# Patient Record
Sex: FEMALE | Race: WHITE | NOT HISPANIC OR LATINO | Employment: UNEMPLOYED | URBAN - METROPOLITAN AREA
[De-identification: names, ages, dates, MRNs, and addresses within clinical notes are randomized per-mention and may not be internally consistent; named-entity substitution may affect disease eponyms.]

---

## 2023-07-18 DIAGNOSIS — Z00.00 ENCOUNTER FOR PREVENTIVE HEALTH EXAMINATION: Primary | ICD-10-CM

## 2023-07-18 DIAGNOSIS — Z11.59 ENCOUNTER FOR HEPATITIS C SCREENING TEST FOR LOW RISK PATIENT: ICD-10-CM

## 2023-07-25 ENCOUNTER — LAB (OUTPATIENT)
Dept: LAB | Facility: LAB | Age: 43
End: 2023-07-25
Payer: COMMERCIAL

## 2023-07-25 ENCOUNTER — OFFICE VISIT (OUTPATIENT)
Dept: PRIMARY CARE | Facility: CLINIC | Age: 43
End: 2023-07-25
Payer: COMMERCIAL

## 2023-07-25 VITALS
WEIGHT: 95.2 LBS | SYSTOLIC BLOOD PRESSURE: 132 MMHG | DIASTOLIC BLOOD PRESSURE: 78 MMHG | BODY MASS INDEX: 16.87 KG/M2 | HEIGHT: 63 IN | HEART RATE: 98 BPM

## 2023-07-25 DIAGNOSIS — Z23 ENCOUNTER FOR IMMUNIZATION: ICD-10-CM

## 2023-07-25 DIAGNOSIS — Z00.00 ENCOUNTER FOR PREVENTIVE HEALTH EXAMINATION: ICD-10-CM

## 2023-07-25 DIAGNOSIS — Z11.59 ENCOUNTER FOR HEPATITIS C SCREENING TEST FOR LOW RISK PATIENT: ICD-10-CM

## 2023-07-25 DIAGNOSIS — Z71.6 ENCOUNTER FOR SMOKING CESSATION COUNSELING: ICD-10-CM

## 2023-07-25 DIAGNOSIS — Z00.00 ENCOUNTER FOR PREVENTIVE HEALTH EXAMINATION: Primary | ICD-10-CM

## 2023-07-25 LAB
ALANINE AMINOTRANSFERASE (SGPT) (U/L) IN SER/PLAS: 14 U/L (ref 7–45)
ALBUMIN (G/DL) IN SER/PLAS: 4.8 G/DL (ref 3.4–5)
ALKALINE PHOSPHATASE (U/L) IN SER/PLAS: 48 U/L (ref 33–110)
ANION GAP IN SER/PLAS: 13 MMOL/L (ref 10–20)
ASPARTATE AMINOTRANSFERASE (SGOT) (U/L) IN SER/PLAS: 26 U/L (ref 9–39)
BILIRUBIN TOTAL (MG/DL) IN SER/PLAS: 0.7 MG/DL (ref 0–1.2)
CALCIUM (MG/DL) IN SER/PLAS: 9.4 MG/DL (ref 8.6–10.3)
CARBON DIOXIDE, TOTAL (MMOL/L) IN SER/PLAS: 27 MMOL/L (ref 21–32)
CHLORIDE (MMOL/L) IN SER/PLAS: 105 MMOL/L (ref 98–107)
CHOLESTEROL (MG/DL) IN SER/PLAS: 147 MG/DL (ref 0–199)
CHOLESTEROL IN HDL (MG/DL) IN SER/PLAS: 82.7 MG/DL
CHOLESTEROL/HDL RATIO: 1.8
CREATININE (MG/DL) IN SER/PLAS: 0.8 MG/DL (ref 0.5–1.05)
ERYTHROCYTE DISTRIBUTION WIDTH (RATIO) BY AUTOMATED COUNT: 12.8 % (ref 11.5–14.5)
ERYTHROCYTE MEAN CORPUSCULAR HEMOGLOBIN CONCENTRATION (G/DL) BY AUTOMATED: 32.9 G/DL (ref 32–36)
ERYTHROCYTE MEAN CORPUSCULAR VOLUME (FL) BY AUTOMATED COUNT: 93 FL (ref 80–100)
ERYTHROCYTES (10*6/UL) IN BLOOD BY AUTOMATED COUNT: 4.38 X10E12/L (ref 4–5.2)
ESTIMATED AVERAGE GLUCOSE FOR HBA1C: 114 MG/DL
GFR FEMALE: >90 ML/MIN/1.73M2
GLUCOSE (MG/DL) IN SER/PLAS: 92 MG/DL (ref 74–99)
HEMATOCRIT (%) IN BLOOD BY AUTOMATED COUNT: 40.7 % (ref 36–46)
HEMOGLOBIN (G/DL) IN BLOOD: 13.4 G/DL (ref 12–16)
HEMOGLOBIN A1C/HEMOGLOBIN TOTAL IN BLOOD: 5.6 %
HEPATITIS C VIRUS AB PRESENCE IN SERUM: NONREACTIVE
LDL: 50 MG/DL (ref 0–99)
LEUKOCYTES (10*3/UL) IN BLOOD BY AUTOMATED COUNT: 8.4 X10E9/L (ref 4.4–11.3)
PLATELETS (10*3/UL) IN BLOOD AUTOMATED COUNT: 232 X10E9/L (ref 150–450)
POTASSIUM (MMOL/L) IN SER/PLAS: 4.2 MMOL/L (ref 3.5–5.3)
PROTEIN TOTAL: 7.1 G/DL (ref 6.4–8.2)
SODIUM (MMOL/L) IN SER/PLAS: 141 MMOL/L (ref 136–145)
THYROTROPIN (MIU/L) IN SER/PLAS BY DETECTION LIMIT <= 0.05 MIU/L: 1.74 MIU/L (ref 0.44–3.98)
TRIGLYCERIDE (MG/DL) IN SER/PLAS: 72 MG/DL (ref 0–149)
UREA NITROGEN (MG/DL) IN SER/PLAS: 10 MG/DL (ref 6–23)
VLDL: 14 MG/DL (ref 0–40)

## 2023-07-25 PROCEDURE — 90471 IMMUNIZATION ADMIN: CPT | Performed by: STUDENT IN AN ORGANIZED HEALTH CARE EDUCATION/TRAINING PROGRAM

## 2023-07-25 PROCEDURE — 99396 PREV VISIT EST AGE 40-64: CPT | Performed by: STUDENT IN AN ORGANIZED HEALTH CARE EDUCATION/TRAINING PROGRAM

## 2023-07-25 PROCEDURE — 85027 COMPLETE CBC AUTOMATED: CPT

## 2023-07-25 PROCEDURE — 3008F BODY MASS INDEX DOCD: CPT | Performed by: STUDENT IN AN ORGANIZED HEALTH CARE EDUCATION/TRAINING PROGRAM

## 2023-07-25 PROCEDURE — 4004F PT TOBACCO SCREEN RCVD TLK: CPT | Performed by: STUDENT IN AN ORGANIZED HEALTH CARE EDUCATION/TRAINING PROGRAM

## 2023-07-25 PROCEDURE — 36415 COLL VENOUS BLD VENIPUNCTURE: CPT

## 2023-07-25 PROCEDURE — 80061 LIPID PANEL: CPT

## 2023-07-25 PROCEDURE — 83036 HEMOGLOBIN GLYCOSYLATED A1C: CPT

## 2023-07-25 PROCEDURE — 84443 ASSAY THYROID STIM HORMONE: CPT

## 2023-07-25 PROCEDURE — 90715 TDAP VACCINE 7 YRS/> IM: CPT | Performed by: STUDENT IN AN ORGANIZED HEALTH CARE EDUCATION/TRAINING PROGRAM

## 2023-07-25 PROCEDURE — 86803 HEPATITIS C AB TEST: CPT

## 2023-07-25 PROCEDURE — 80053 COMPREHEN METABOLIC PANEL: CPT

## 2023-07-25 ASSESSMENT — PATIENT HEALTH QUESTIONNAIRE - PHQ9
2. FEELING DOWN, DEPRESSED OR HOPELESS: NOT AT ALL
SUM OF ALL RESPONSES TO PHQ9 QUESTIONS 1 AND 2: 0
1. LITTLE INTEREST OR PLEASURE IN DOING THINGS: NOT AT ALL

## 2023-07-25 NOTE — ASSESSMENT & PLAN NOTE
As discussed, try smoking 1 fewer cigarettes per week   If this is unsuccessful, we can discuss pharmaceutical options

## 2023-07-25 NOTE — ASSESSMENT & PLAN NOTE
Complete history and physical examination was performed  Tdap vaccine administered in office today  We will notify of test results once available and make treatment recommendations accordingly

## 2023-07-25 NOTE — PROGRESS NOTES
"Subjective   Patient ID: Rosa Araujo is a 43 y.o. female who presents for Annual Exam.    HPI  1.  Physical exam.  Last mammogram November 2022 with 1 year clearance  Last Pap December 2022, normal, follows with gynecology  Due for Tdap  Blood work done in anticipation of today's visit  Still smoking 5 to 6 cigarettes daily, started in her 20s smoking about a pack a day    Review of Systems  All pertinent positive symptoms are included in the history of present illness.    All other systems have been reviewed and are negative and noncontributory to this patient's current ailments.    No current outpatient medications  Allergies   Allergen Reactions    Naproxen Sodium Rash       Immunization History   Administered Date(s) Administered    Pfizer Purple Cap SARS-CoV-2 03/22/2021, 04/19/2021, 12/06/2021    Tdap 07/24/2013, 07/25/2023     Past Surgical History:   Procedure Laterality Date    OTHER SURGICAL HISTORY  12/13/2022    No history of surgery     No family history on file.  Social History     Tobacco Use    Smoking status: Every Day     Types: Cigarettes    Smokeless tobacco: Never       Objective   Visit Vitals  /78   Pulse 98   Ht 1.6 m (5' 3\")   Wt (!) 43.2 kg (95 lb 3.2 oz)   BMI 16.86 kg/m²   Smoking Status Every Day   BSA 1.39 m²       Physical Exam  CONSTITUTIONAL - well nourished, well developed, looks like stated age, in no acute distress, not ill-appearing, and not tired appearing  SKIN - normal skin color and pigmentation, normal skin turgor without rash, lesions, or nodules visualized  HEAD - no trauma, normocephalic  CHEST - clear to auscultation, no wheezing, no crackles and no rales, good effort  CARDIAC - regular rate and regular rhythm, no skipped beats, no murmur  EXTREMITIES - no edema, no deformities  NEUROLOGICAL - normal gait, normal balance, normal motor  PSYCHIATRIC - alert, pleasant and cordial, age-appropriate    Assessment/Plan   Problem List Items Addressed This Visit  "         Health Encounters    Encounter for preventive health examination - Primary     Complete history and physical examination was performed  Tdap vaccine administered in office today  We will notify of test results once available and make treatment recommendations accordingly            Tobacco    Encounter for smoking cessation counseling     As discussed, try smoking 1 fewer cigarettes per week   If this is unsuccessful, we can discuss pharmaceutical options          Other Visit Diagnoses       Encounter for immunization        Relevant Orders    Tdap vaccine, age 10 years and older  (BOOSTRIX) (Completed)    Adult BMI <19 kg/sq m

## 2024-02-01 ENCOUNTER — HOSPITAL ENCOUNTER (OUTPATIENT)
Dept: RADIOLOGY | Facility: CLINIC | Age: 44
Discharge: HOME | End: 2024-02-01
Payer: COMMERCIAL

## 2024-02-01 VITALS — BODY MASS INDEX: 16.88 KG/M2 | HEIGHT: 63 IN | WEIGHT: 95.24 LBS

## 2024-02-01 DIAGNOSIS — Z12.31 ENCOUNTER FOR SCREENING MAMMOGRAM FOR MALIGNANT NEOPLASM OF BREAST: ICD-10-CM

## 2024-02-01 PROCEDURE — 77063 BREAST TOMOSYNTHESIS BI: CPT | Performed by: RADIOLOGY

## 2024-02-01 PROCEDURE — 77067 SCR MAMMO BI INCL CAD: CPT | Performed by: RADIOLOGY

## 2024-02-01 PROCEDURE — 77067 SCR MAMMO BI INCL CAD: CPT

## 2025-06-17 ENCOUNTER — HOSPITAL ENCOUNTER (OUTPATIENT)
Dept: RADIOLOGY | Facility: HOSPITAL | Age: 45
Discharge: HOME | End: 2025-06-17
Payer: COMMERCIAL

## 2025-06-17 ENCOUNTER — OFFICE VISIT (OUTPATIENT)
Dept: ORTHOPEDIC SURGERY | Facility: HOSPITAL | Age: 45
End: 2025-06-17
Payer: COMMERCIAL

## 2025-06-17 VITALS — WEIGHT: 100 LBS | HEIGHT: 63 IN | BODY MASS INDEX: 17.72 KG/M2

## 2025-06-17 DIAGNOSIS — M25.522 LEFT ELBOW PAIN: ICD-10-CM

## 2025-06-17 DIAGNOSIS — M77.12 LATERAL EPICONDYLITIS OF LEFT ELBOW: ICD-10-CM

## 2025-06-17 PROCEDURE — 99203 OFFICE O/P NEW LOW 30 MIN: CPT | Performed by: PHYSICIAN ASSISTANT

## 2025-06-17 PROCEDURE — 73080 X-RAY EXAM OF ELBOW: CPT | Mod: LT

## 2025-06-17 PROCEDURE — 73080 X-RAY EXAM OF ELBOW: CPT | Mod: LEFT SIDE | Performed by: RADIOLOGY

## 2025-06-17 PROCEDURE — 3008F BODY MASS INDEX DOCD: CPT | Performed by: PHYSICIAN ASSISTANT

## 2025-06-17 RX ORDER — MELOXICAM 15 MG/1
15 TABLET ORAL DAILY
Qty: 14 TABLET | Refills: 0 | Status: SHIPPED | OUTPATIENT
Start: 2025-06-17 | End: 2025-07-01

## 2025-06-17 RX ORDER — MELOXICAM 15 MG/1
15 TABLET ORAL DAILY
Qty: 14 TABLET | Refills: 0 | Status: SHIPPED | OUTPATIENT
Start: 2025-06-17 | End: 2025-06-17 | Stop reason: SDUPTHER

## 2025-06-17 ASSESSMENT — PAIN SCALES - GENERAL: PAINLEVEL_OUTOF10: 7

## 2025-06-17 ASSESSMENT — PAIN - FUNCTIONAL ASSESSMENT: PAIN_FUNCTIONAL_ASSESSMENT: 0-10

## 2025-06-17 NOTE — PROGRESS NOTES
HPI  This is a pleasant 44 y.o. female here today at the Orthopedic injury clinic for further evaluation of left elbow pain.   She is RHD.  The pain started about a month ago.  She does recall bumping her elbow but is unsure if that is related to her current pain.  It is starting to get more constant.  Pain is worse with pulling and grabbing with the left arm.  That pain can get up to a 10 out of 10.  She denies any radicular pain in the arm.  No numbness or tingling.      Medical History[1]    Surgical History[2]    Social History[3]        ROS  Reviewed and all pertinent positives mentioned in HPI.      EXAM  Gen:  Patient is in no acute distress.    HEENT: NCAT, EOMI  RESP: unlabored breathing  Skin:  warm, dry, no open wounds  Neur: They are alert and oriented x3.    Psych: They are of normal mood and affect.  They are in no acute distress.      Musc:   On examination of the left elbow;  Normal alignment;  Minimal swelling, no ecchymosis   Normal elbow extension,flexion, pronation and supination, Normal ROM in wrist flexion, pronation and supination, ROM finger and thumb normal.   Pain with resisted wrist extension.  Normal strength in elbow extension, flexion, pronation and supination; wrist finger and thumb strength normal; normal  strength  Tenderness to palpation: lateral epicondyle  No tenderness over the olecranon UCL, radius, ulna, medial or lateral epicondyles, scaphoid  NVI, good cap refill        IMAGING  Imaging reviewed today reveal no gross fracture or dislocation.        ASSESSMENT/PLAN  Left elbow pain consistent with Lateral epicondylitis.  Will start a course of consistent NSAIDs and OT. Also discussed bracing.   Provided follow up information if symptoms persist.    MELECIO Beaulieu PA-C         [1] No past medical history on file.  [2]   Past Surgical History:  Procedure Laterality Date    OTHER SURGICAL HISTORY  12/13/2022    No history of surgery   [3]   Social  History  Tobacco Use    Smoking status: Every Day     Types: Cigarettes    Smokeless tobacco: Never

## 2025-06-24 DIAGNOSIS — M77.12 LATERAL EPICONDYLITIS OF LEFT ELBOW: Primary | ICD-10-CM

## 2025-07-07 ENCOUNTER — EVALUATION (OUTPATIENT)
Dept: OCCUPATIONAL THERAPY | Facility: CLINIC | Age: 45
End: 2025-07-07
Payer: COMMERCIAL

## 2025-07-07 DIAGNOSIS — M25.522 LEFT ELBOW PAIN: Primary | ICD-10-CM

## 2025-07-07 DIAGNOSIS — M77.12 LATERAL EPICONDYLITIS OF LEFT ELBOW: ICD-10-CM

## 2025-07-07 PROCEDURE — 97165 OT EVAL LOW COMPLEX 30 MIN: CPT | Mod: GO

## 2025-07-07 PROCEDURE — 97530 THERAPEUTIC ACTIVITIES: CPT | Mod: GO

## 2025-07-07 PROCEDURE — 97035 APP MDLTY 1+ULTRASOUND EA 15: CPT | Mod: GO

## 2025-07-07 ASSESSMENT — ENCOUNTER SYMPTOMS
OCCASIONAL FEELINGS OF UNSTEADINESS: 0
DEPRESSION: 0
LOSS OF SENSATION IN FEET: 0

## 2025-07-07 ASSESSMENT — PAIN DESCRIPTION - DESCRIPTORS: DESCRIPTORS: ACHING

## 2025-07-07 ASSESSMENT — PAIN - FUNCTIONAL ASSESSMENT: PAIN_FUNCTIONAL_ASSESSMENT: 0-10

## 2025-07-07 ASSESSMENT — PAIN SCALES - GENERAL: PAINLEVEL_OUTOF10: 3

## 2025-07-07 NOTE — PROGRESS NOTES
Occupational Therapy    Evaluation/Treatment    Patient Name: Rosa Araujo  MRN: [id number]  : 1980  Today's Date: 25     Time Calculation  Start Time: 1015  Stop Time: 1100  Time Calculation (min): 45 min  Therapeutic Procedure Codes:  OT Evaluation Time Entry  Evaluation (Low) Time Entry: 20   OT Therapeutic Procedures Time Entry  Therapeutic Activity Time Entry: 17   OT Modalities Time Entry  Ultrasound Time Entry: 8                     Subjective   Current Problem:  1. Left elbow pain        2. Lateral epicondylitis of left elbow  Referral to Occupational Therapy    Follow Up In Occupational Therapy        General: Pt presents with L elbow pain and weakness d/t L lateral epicondylitis, affecting her ability to carryout I/ADLs efficiently. Pt states the pain in her elbow began sometime in May 2025; pt cannot pinpoint a specific event, but assumed she bumped her elbow and the pain would go away in a week. Pt states that pain got progressively worse with activity. Therefore, pt went to ortho for further investigation. Pt diagnosed with L lateral epicondylitis and instructed to take ibuprofen as needed, ice, rest and use counter force pain to combat symptoms, per chart review. Pt subsequently referred to occupational therapy for skilled services.     Pt states that the pain at her arm has subsided a little bit since her visit with the orthopedic doctor. Pt was unsure when to wear OTC brace.   OT Received On: 25  General  Reason for Referral: L elbow pain  Referred By: Larissa Barroso PA-C        Hand Preference: Right/L injury     Precautions:  STEADI Fall Risk Score (The score of 4 or more indicates an increased risk of falling): 0  Precautions Comment: pain     I have reviewed patients medical history form.   Pain:  Pain Assessment  Pain Assessment: 0-10  0-10 (Numeric) Pain Score: 3 (3/10 at rest; 7-8/10 with gripping at L elbow)  Pain Location: Elbow  Pain Orientation: Left  Pain Descriptors:  Aching    Objective      Home Living: Lives with  and two kids (14, 11)       Prior Function: IND; stay at home mom. Active - does yoga and weight lifting.       ADL/IADL: IND; pain with activity, especially while gripping or pulling objects. Pt is not weight lifting or doing yoga d/t pain.         Therapy/Activity:    DOI: May 2025      7/7/2025   Exercises: Reps:   Lateral epicondylitis AROM Phase II (4)   PROM Phase III (4)  1x3, 15 second holds each    Elbow extended, wrist flexed/extended 1x3, 15 second holds                    HEP provided to pt on 7/7/2025   HEP provided to pt including lateral epicondylitis AROM, STM and activity modification . Pt instructed to complete 2-3x a day, 10 reps within pain free range. Handouts provided to pt.    Activities:     Educated pt on importance of wearing counterforce brace while completing activity   Elbow sleeve Fabricated pt padded elbow sleeve for protection and pain relief.            Modalities:    U/S 3.3 MHz 1 W/cm^2 about L lateral epicondyle and surrounding tissues for pain relief.                Manual:    STM CW, CCW, cross-directional about L lateral epicondyle for pain relief. Instructed pt to complete at home.                Functional review:     Completed on: 7/7/2025 OT evaluation      Measurements:    Elbow ROM:  WFL unless documented below   Flexion Extension  Supination Pronation   Right 145 0 90 90   Left 149 0 90 90    *slight pain with L elbow flexion        Sensation: SILT         Outcome Measures:   Quickdash Scores: 29.55%  Raw Score: 24    OP EDUCATION:  Education  Individual(s) Educated: Patient  Education Provided: Anatomy & Physiology, Diagnosis & Precautions, Risk and benefits of OT discussed with patient or other, POC discussed and agreed upon  Home Program: AROM, PROM, Activity modification, Orthotic wearing schedule, care and precautions, Handout issued  Risk and Benefits Discussed with Patient/Caregiver/Other:  yes  Patient/Caregiver Demonstrated Understanding: yes  Plan of Care Discussed and Agreed Upon: yes  Patient Response to Education: Patient/Caregiver Verbalized Understanding of Information, Patient/Caregiver Performed Return Demonstration of Exercises/Activities, Patient/Caregiver Asked Appropriate Questions    Assessment:  Pt is a 45 year old female who presents to this facility with performance deficits in R elbow weakness and pain limiting ability to complete ADL and IADL tasks. Pt  provided with HEP including LUE PROM and soft tissue mobilization. Handouts provided to pt. Pt demonstrated understanding. Pt would benefit from skilled therapy services to address deficits listed above in order to enhance independence with I/ADLs         Plan: pain modulation through modalities, STM and PROM.      OT Plan: Occupational therapy intervention plan to include education/instruction, electrical stimulation, hot pack, ultrasound, manual therapy,  orthotic fitting/training, self-care/home management, therapeutic exercises, therapeutic activities, and home program.  Frequency: 1x a week  Duration: 6-8x weeks    Goals:  Active       OT Goals       OT Goal 1       Start:  07/07/25    Expected End:  10/07/25       LTG - Patient will indicate/ demonstrate the ability to resume all preinjury ADLs and IADLs without significant limits secondary to decreased ROM, decreased strength and/or pain as indicated by Quickdash score of less than 15%.           OT Goal 2       Start:  07/07/25    Expected End:  10/07/25       Develop and issue HEP to help maximize ROM, strength and tolerance to help maximize return to all pre-onset activities.          OT Goal 3       Start:  07/07/25    Expected End:  10/07/25       Pain to be less than or equal to 2/10 with greater than or equal to 45 minutes activity.          OT Goal 4       Start:  07/07/25    Expected End:  10/07/25       Pt will participate in an assessment of her  strength as  appropriate.

## 2025-07-21 ENCOUNTER — TREATMENT (OUTPATIENT)
Dept: OCCUPATIONAL THERAPY | Facility: CLINIC | Age: 45
End: 2025-07-21
Payer: COMMERCIAL

## 2025-07-21 DIAGNOSIS — M77.12 LATERAL EPICONDYLITIS OF LEFT ELBOW: ICD-10-CM

## 2025-07-21 DIAGNOSIS — M25.522 LEFT ELBOW PAIN: Primary | ICD-10-CM

## 2025-07-21 PROCEDURE — 97140 MANUAL THERAPY 1/> REGIONS: CPT | Mod: GO

## 2025-07-21 PROCEDURE — 97110 THERAPEUTIC EXERCISES: CPT | Mod: GO

## 2025-07-21 ASSESSMENT — PAIN - FUNCTIONAL ASSESSMENT: PAIN_FUNCTIONAL_ASSESSMENT: 0-10

## 2025-07-21 ASSESSMENT — PAIN SCALES - GENERAL: PAINLEVEL_OUTOF10: 0 - NO PAIN

## 2025-07-21 NOTE — PROGRESS NOTES
Occupational Therapy    OT Treatment    Patient Name: Rosa Araujo  MRN: [id number]  Today's Date: 7/21/2025     Time Calculation  Start Time: 1015  Stop Time: 1100  Time Calculation (min): 45 min    Visit Number: 2  Therapeutic Procedures:      OT Therapeutic Procedures Time Entry  Therapeutic Exercise Time Entry: 27  Manual Therapy Time Entry: 10   OT Modalities Time Entry  Ultrasound Time Entry: 8                     Current Problem:  1. Left elbow pain        2. Lateral epicondylitis of left elbow  Follow Up In Occupational Therapy          Subjective     General: Pt states that she is in a lot less pain since the initial evaluation. Pt states that she does not like the counter force brace, she does like the compression sleeve however. She has not been completing the PROM stretches as her arm feels sore/she has some pain with those exercises.      OT Received On: 07/21/25  Reason for Referral: L elbow pain  Referred By: Larissa Barroso PA-C  Pain:  Pain Assessment  Pain Assessment: 0-10  0-10 (Numeric) Pain Score: 0 - No pain    Objective       Therapy/Activity:   DOI: May 2025       7/7/2025 7/21/2025   Exercises: Reps:    MH  About L elbow prior to treatment   Lateral epicondylitis AROM Phase II (4)   PROM Phase III (4)  1x3, 15 second holds each  1x5, 15 second holds each *Phase II   Elbow extended, wrist flexed/extended 1x3, 15 second holds  1x5, 15 second holds                        HEP provided to pt on 7/7/2025   HEP provided to pt including lateral epicondylitis AROM, STM and activity modification . Pt instructed to complete 2-3x a day, 10 reps within pain free range. Handouts provided to pt.     Activities:      Educated pt on importance of wearing counterforce brace while completing activity    Elbow sleeve Fabricated pt padded elbow sleeve for protection and pain relief.               Modalities:     U/S 3.3 MHz 1 W/cm^2 about L lateral epicondyle and surrounding tissues for pain relief.  3.3 MHz 1  W/cm^2 about L lateral epicondyle and surrounding tissues for pain relief.                   Manual:     STM CW, CCW, cross-directional about L lateral epicondyle for pain relief. Instructed pt to complete at home.  About lateral epicondyle and surrounding tissues for pain relief.                   Functional review:      Completed on: 7/7/2025 OT evaluation         OP EDUCATION:  Education  Individual(s) Educated: Patient  Education Provided: Anatomy & Physiology, Diagnosis & Precautions, Risk and benefits of OT discussed with patient or other, POC discussed and agreed upon  Home Program: AROM, PROM, Activity modification, Orthotic wearing schedule, care and precautions, Handout issued    Assessment:   Pt participated in therapeutic exercises and activities as needed to decrease pain about L elbow. Re-instructed pt in current HEP. Pt reporting pain/discomfort with PROM phase III exercises, therefore discontinued from HEP. Pt able to tolerate session otherwise. Continued ultrasound and STM for pain relief. Pt reported no pain at end of session.      Plan:   Pt to continue addressing LUE pain relief through U/S, STM, A/AROM as tolerated.      Goals:  Active       OT Goals       OT Goal 1       Start:  07/07/25    Expected End:  10/07/25       LTG - Patient will indicate/ demonstrate the ability to resume all preinjury ADLs and IADLs without significant limits secondary to decreased ROM, decreased strength and/or pain as indicated by Quickdash score of less than 15%.           OT Goal 2       Start:  07/07/25    Expected End:  10/07/25       Develop and issue HEP to help maximize ROM, strength and tolerance to help maximize return to all pre-onset activities.          OT Goal 3       Start:  07/07/25    Expected End:  10/07/25       Pain to be less than or equal to 2/10 with greater than or equal to 45 minutes activity.          OT Goal 4       Start:  07/07/25    Expected End:  10/07/25       Pt will participate in  an assessment of her  strength as appropriate.

## 2025-07-28 ENCOUNTER — TREATMENT (OUTPATIENT)
Dept: OCCUPATIONAL THERAPY | Facility: CLINIC | Age: 45
End: 2025-07-28
Payer: COMMERCIAL

## 2025-07-28 DIAGNOSIS — M77.12 LATERAL EPICONDYLITIS OF LEFT ELBOW: ICD-10-CM

## 2025-07-28 DIAGNOSIS — M25.522 LEFT ELBOW PAIN: Primary | ICD-10-CM

## 2025-07-28 PROCEDURE — 97110 THERAPEUTIC EXERCISES: CPT | Mod: GO

## 2025-07-28 PROCEDURE — 97035 APP MDLTY 1+ULTRASOUND EA 15: CPT | Mod: GO

## 2025-07-28 ASSESSMENT — PAIN SCALES - GENERAL: PAINLEVEL_OUTOF10: 0 - NO PAIN

## 2025-07-28 ASSESSMENT — PAIN - FUNCTIONAL ASSESSMENT: PAIN_FUNCTIONAL_ASSESSMENT: 0-10

## 2025-07-28 NOTE — PROGRESS NOTES
Occupational Therapy    OT Treatment    Patient Name: Rosa Araujo  MRN: [id number]  Today's Date: 7/28/2025     Time Calculation  Start Time: 1015  Stop Time: 1055  Time Calculation (min): 40 min    Visit Number: 3  Therapeutic Procedures:      OT Therapeutic Procedures Time Entry  Therapeutic Exercise Time Entry: 32   OT Modalities Time Entry  Ultrasound Time Entry: 8                     Current Problem:  1. Left elbow pain        2. Lateral epicondylitis of left elbow  Follow Up In Occupational Therapy          Subjective     General: Pt states that she was helping a friend set up at a grad party, had little/to no pain at L arm.   OT Received On: 07/28/25  Reason for Referral: L elbow pain  Referred By: Larissa Barroso PA-C  Pain:  Pain Assessment  Pain Assessment: 0-10  0-10 (Numeric) Pain Score: 0 - No pain    Objective       Therapy/Activity:   DOI: May 2025        7/7/2025 7/21/2025 7/28/2025   Exercises: Reps:     MH  About L elbow prior to treatment About L elbow prior to treatment   Lateral epicondylitis AROM Phase II (4)   PROM Phase III (4)  1x3, 15 second holds each  1x5, 15 second holds each *Phase II 1x5, 15 second holds each    Elbow extended, wrist flexed/extended 1x3, 15 second holds  1x5, 15 second holds  1x5, 15 second holds                            HEP provided to pt on 7/7/2025   HEP provided to pt including lateral epicondylitis AROM, STM and activity modification . Pt instructed to complete 2-3x a day, 10 reps within pain free range. Handouts provided to pt.      Activities:       Educated pt on importance of wearing counterforce brace while completing activity     Elbow sleeve Fabricated pt padded elbow sleeve for protection and pain relief.                  Modalities:      U/S 3.3 MHz 1 W/cm^2 about L lateral epicondyle and surrounding tissues for pain relief.  3.3 MHz 1 W/cm^2 about L lateral epicondyle and surrounding tissues for pain relief.  3.3 MHz 1 W/cm^2 about L lateral epicondyle  and surrounding tissues for pain relief.                      Manual:      STM CW, CCW, cross-directional about L lateral epicondyle for pain relief. Instructed pt to complete at home.  About lateral epicondyle and surrounding tissues for pain relief.                       Functional review:       Completed on: 7/7/2025 OT evaluation          OP EDUCATION:  Education  Individual(s) Educated: Patient  Education Provided: Anatomy & Physiology, Diagnosis & Precautions, Risk and benefits of OT discussed with patient or other, POC discussed and agreed upon  Home Program: AROM, PROM, Activity modification, Orthotic wearing schedule, care and precautions, Handout issued    Assessment:   Pt participated in therapeutic exercises and activities as needed to decrease pain about L elbow. Continued A/AROM of LUE. Pt did not report pain/discomfort with PROM phase III exercises, therefore added to HEP. Continued ultrasound and STM for pain relief. Pt reported no pain at end of session.      Plan:   Pt to continue addressing LUE pain relief through U/S, STM, A/AROM as tolerated.      Goals:  Active       OT Goals       OT Goal 1       Start:  07/07/25    Expected End:  10/07/25       LTG - Patient will indicate/ demonstrate the ability to resume all preinjury ADLs and IADLs without significant limits secondary to decreased ROM, decreased strength and/or pain as indicated by Quickdash score of less than 15%.           OT Goal 2       Start:  07/07/25    Expected End:  10/07/25       Develop and issue HEP to help maximize ROM, strength and tolerance to help maximize return to all pre-onset activities.          OT Goal 3       Start:  07/07/25    Expected End:  10/07/25       Pain to be less than or equal to 2/10 with greater than or equal to 45 minutes activity.          OT Goal 4       Start:  07/07/25    Expected End:  10/07/25       Pt will participate in an assessment of her  strength as appropriate.

## 2025-08-04 ENCOUNTER — DOCUMENTATION (OUTPATIENT)
Dept: OCCUPATIONAL THERAPY | Facility: CLINIC | Age: 45
End: 2025-08-04
Payer: COMMERCIAL

## 2025-08-04 NOTE — PROGRESS NOTES
Occupational Therapy                 Therapy Communication Note    Patient Name: Rosa Araujo  MRN: [id number]  Today's Date: 8/4/2025     Discipline: Occupational Therapy    Missed Visit Reason:  Cancel     Missed Time: Cancel

## 2025-08-11 ENCOUNTER — DOCUMENTATION (OUTPATIENT)
Dept: OCCUPATIONAL THERAPY | Facility: CLINIC | Age: 45
End: 2025-08-11
Payer: COMMERCIAL